# Patient Record
Sex: MALE | Race: OTHER | HISPANIC OR LATINO | URBAN - METROPOLITAN AREA
[De-identification: names, ages, dates, MRNs, and addresses within clinical notes are randomized per-mention and may not be internally consistent; named-entity substitution may affect disease eponyms.]

---

## 2021-01-21 ENCOUNTER — EMERGENCY (EMERGENCY)
Facility: HOSPITAL | Age: 56
LOS: 1 days | Discharge: HOME | End: 2021-01-21
Attending: EMERGENCY MEDICINE | Admitting: EMERGENCY MEDICINE
Payer: COMMERCIAL

## 2021-01-21 VITALS
RESPIRATION RATE: 16 BRPM | DIASTOLIC BLOOD PRESSURE: 90 MMHG | WEIGHT: 240.08 LBS | TEMPERATURE: 98 F | OXYGEN SATURATION: 93 % | SYSTOLIC BLOOD PRESSURE: 160 MMHG | HEART RATE: 120 BPM | HEIGHT: 68 IN

## 2021-01-21 VITALS
DIASTOLIC BLOOD PRESSURE: 97 MMHG | OXYGEN SATURATION: 94 % | RESPIRATION RATE: 18 BRPM | TEMPERATURE: 98 F | SYSTOLIC BLOOD PRESSURE: 143 MMHG | HEART RATE: 87 BPM

## 2021-01-21 DIAGNOSIS — R41.82 ALTERED MENTAL STATUS, UNSPECIFIED: ICD-10-CM

## 2021-01-21 DIAGNOSIS — F10.129 ALCOHOL ABUSE WITH INTOXICATION, UNSPECIFIED: ICD-10-CM

## 2021-01-21 PROCEDURE — 82962 GLUCOSE BLOOD TEST: CPT

## 2021-01-21 PROCEDURE — 99284 EMERGENCY DEPT VISIT MOD MDM: CPT

## 2021-01-21 PROCEDURE — 99285 EMERGENCY DEPT VISIT HI MDM: CPT | Mod: 25

## 2021-01-21 PROCEDURE — 80053 COMPREHEN METABOLIC PANEL: CPT

## 2021-01-21 PROCEDURE — 36415 COLL VENOUS BLD VENIPUNCTURE: CPT

## 2021-01-21 PROCEDURE — 85025 COMPLETE CBC W/AUTO DIFF WBC: CPT

## 2021-01-21 RX ORDER — SODIUM CHLORIDE 9 MG/ML
1000 INJECTION INTRAMUSCULAR; INTRAVENOUS; SUBCUTANEOUS ONCE
Refills: 0 | Status: COMPLETED | OUTPATIENT
Start: 2021-01-21 | End: 2021-01-21

## 2021-01-21 RX ADMIN — SODIUM CHLORIDE 1000 MILLILITER(S): 9 INJECTION INTRAMUSCULAR; INTRAVENOUS; SUBCUTANEOUS at 18:32

## 2021-01-21 NOTE — ED ADULT NURSE NOTE - CAS ELECT INFOMATION PROVIDED
Wife on speaker phone while pt. was being discharged, dc instructions reviewed, verbalized understanding/DC instructions

## 2021-01-21 NOTE — ED PROVIDER NOTE - ATTENDING CONTRIBUTION TO CARE
clinically intoxicated without trauma.  accucheck wnl.  no signs of infection clinically.  protecting airway. observed for sobriety.  initially tachycardic but hr normalized with ivf and observation. reassessment with improved mental status.  alert and oriented x3 with steady gait at time of discharge

## 2021-01-21 NOTE — ED PROVIDER NOTE - OBJECTIVE STATEMENT
The pt is a 54 y/o M, BIBA, for intox - pt went to work intoxicated and someone called called ems, pt admits to drinking alcohol - drinks daily. Denies any physical c/o, no n/v/d, no cp, no sob, no abd pain, no falls

## 2021-01-21 NOTE — ED ADULT NURSE NOTE - OBJECTIVE STATEMENT
56 yo male BIBA from work. As per EMS "pt noted to be lethargic at work". Pt. reports drinking everyday, unclear amount, reports last drink of vodka this AM. Report daily marijuana use. Denies chest pain, SOB, dizziness, fever/chills, n/v/d.

## 2021-01-21 NOTE — ED PROVIDER NOTE - PATIENT PORTAL LINK FT
You can access the FollowMyHealth Patient Portal offered by [facility] by registering at the following website: [email or web address]. By joining [facility]’s FollowMyHealth portal, you will also be able to view your health information using other applications (apps) compatible with our system.

## 2021-01-21 NOTE — ED PROVIDER NOTE - NSFOLLOWUPINSTRUCTIONS_ED_ALL_ED_FT
Alcohol intoxication occurs when the amount of alcohol that a person has consumed impairs his or her ability to mentally and physically function. Chronic alcohol consumption can also lead to a variety of health issues including neurological disease, stomach disease, heart disease, liver disease, etc. Do not drive after drinking alcohol. Drinking enough alcohol to end up in an Emergency Room suggests you may have an alcohol abuse problem. Seek help at a drug addiction center.    SEEK IMMEDIATE MEDICAL CARE IF YOU HAVE ANY OF THE FOLLOWING SYMPTOMS: seizures, vomiting blood, blood in your stool, lightheadedness/dizziness, or becoming shaky to tremulous when you stop drinking. KEEP YOURSELF HYDRATED WITH WATER, FOLLOW UP WITH YOUR PRIMARY DOCTOR    Alcohol intoxication occurs when the amount of alcohol that a person has consumed impairs his or her ability to mentally and physically function. Chronic alcohol consumption can also lead to a variety of health issues including neurological disease, stomach disease, heart disease, liver disease, etc. Do not drive after drinking alcohol. Drinking enough alcohol to end up in an Emergency Room suggests you may have an alcohol abuse problem. Seek help at a drug addiction center.    SEEK IMMEDIATE MEDICAL CARE IF YOU HAVE ANY OF THE FOLLOWING SYMPTOMS: seizures, vomiting blood, blood in your stool, lightheadedness/dizziness, or becoming shaky to tremulous when you stop drinking.

## 2021-01-21 NOTE — ED ADULT TRIAGE NOTE - CHIEF COMPLAINT QUOTE
pt BIBA from work. as per EMS " pt noted to be lethargic at work " pt denies dizziness, cp, SOB. admits to be drinking vodka this morning and drinking every day. pt denies drug use.

## 2021-01-21 NOTE — ED PROVIDER NOTE - CLINICAL SUMMARY MEDICAL DECISION MAKING FREE TEXT BOX
pt biba for etoh intox, admits to drinking, no signs of trauma, no falls, fs stable, pt w/dry mucous membranes and admits to not drinking any water today - only drank alcohol, hence given ivf, observed until clinically sober and dc'd home, detox info offered pt biba for etoh intox, admits to drinking, no signs of trauma, no falls, fs stable, pt w/dry mucous membranes and admits to not drinking any water today - only drank alcohol, hence given ivf, ekg nsr not afib, observed until clinically sober and dc'd home, detox info offered

## 2024-10-25 NOTE — ED PROVIDER NOTE - CONSTITUTIONAL MANNER
Continued Stay SW/CM Assessment/Plan of Care Note       Progress note:  SW following.  Insurance requesting peer to peer.   Phone # x [phone] by [phone] today.  Provided information to Dr Perea.  Will follow.    Addendum:  Insurance denied, peer to peer not completed before deadline.  [facility] will appeal on pt's behalf.    D/W pt and he signed Appointment of Representative in order to allow [facility] to initiate appeal.  AOR sent to [facility], they will initate appeal.    Will follow.    Disposition Recommendations:  Preliminary discharge destination: Planned Discharge Destination: Rehabilitation/Skilled Care  SW/CM recommendation for discharge: Sub-acute nursing home    Destination Pharmacy: [facility] [address] Pharmacy confirmed with facility, Preferred pharmacy updated    Discharge Plan/Needs:     Continued Care and Services - Admitted Since 10/4/2024       Destination  Coordination complete.      Service Provider Request Status Selected Services Address Phone Fax    [facility]  [facility] [address] [facility] [address] [phone]                      Prior To Hospitalization:    Living Situation: Other facility residents and residing at Long term care facility    .  Support Systems: Family members, Neighbors   Home Devices/Equipment: None            Mobility Assist Devices: Total lift, Wheelchair   Type of Service Prior to Hospitalization: DME, Other (comment) (SNF)               Patient/Family discharge goal (s):  Sub-acute nursing home       Current Function  Last Filed Values         Value Time User    Current Function  slightly below baseline level of function 10/24/2024 12:40 PM Soni Trejo PT    Therapy Impairments  activity tolerance; balance; strength; skin integrity 10/24/2024 12:40 PM Soni Trejo PT    ADLs Requiring Support  bed mobility; transfers; ambulation; stairs  10/24/2024 12:40 PM Soni Trejo, PT            Therapy Recommendations for Discharge:   PT:      Last Filed Values         Value Time User    PT Discharge Needs  therapy 5 or more times per week 10/24/2024 12:40 PM Soni Trejo, PT          OT:       Last Filed Values         Value Time User    OT Discharge Needs  therapy 5 or more times per week 10/24/2024  4:31 PM Latonia Andres OT          Mobility Equipment Recommended for Discharge: Kari lift, wheelchair, hospital bed      Mimi Montoya MSW, LSW  [id number]                  + AOB

## 2025-02-11 NOTE — ED ADULT TRIAGE NOTE - WEIGHT IN KG
What Type Of Note Output Would You Prefer (Optional)?: Standard Output
How Severe Are Your Spot(S)?: moderate
Have Your Spot(S) Been Treated In The Past?: has not been treated
Hpi Title: Evaluation of Skin Lesions
108.9